# Patient Record
Sex: FEMALE | Race: WHITE | Employment: UNEMPLOYED | URBAN - METROPOLITAN AREA
[De-identification: names, ages, dates, MRNs, and addresses within clinical notes are randomized per-mention and may not be internally consistent; named-entity substitution may affect disease eponyms.]

---

## 2024-04-05 ENCOUNTER — TELEPHONE (OUTPATIENT)
Dept: PLASTIC SURGERY | Facility: CLINIC | Age: 19
End: 2024-04-05
Payer: COMMERCIAL

## 2024-04-05 DIAGNOSIS — F64.0 GENDER DYSPHORIA IN ADULT: Primary | ICD-10-CM

## 2024-04-05 NOTE — TELEPHONE ENCOUNTER
Health Call Center    Phone Message    May a detailed message be left on voicemail: yes     Reason for Call: Appointment Intake    Referring Provider Name: Self Referred  Diagnosis and/or Symptoms: Breast Removal/Mastectomy (Gender Affirmation/Confirmation Surgery)    Pt requesting Dr. Mcdaniel, but is open to other providers if they are sooner appts.    Pt prefers to be called [place]. Preferred pronouns, they/them, non binary.         Action Taken: Message routed to:  [facility] ([facility]): [facility] COMPREHENSIVE GENDER [facility] [[id number]]    Travel Screening: Not Applicable

## 2024-04-08 NOTE — CONFIDENTIAL NOTE
[facility] :  Care Coordination Note     SITUATION   Primo Altman (they/them) is a 18 year old adult who is receiving support for:  Appointment (Breast Removal/Mastectomy (Gender Affirmation/Confirmation Surgery))  .    BACKGROUND     Pt is scheduled for a gender affirming mastectomy consult with Dr. Couch on 11/1/24.     ASSESSMENT     Surgery              [facility] Assessment  Com[facility] ([facility]) Enrollment: (P) Enrolled  Patient has a therapist: (P) Yes  Letter of support #1: (P) Requested  Surgery being considered: (P) Yes  Mastectomy: (P) Yes    Pt reports:   No nicotine or other gender affirming surgeries  PLAN          Nursing Interventions:       Follow-up plan:  1. Obtain LOS. Writer encouraged pt to send this to us 2-3 months in advance.        Cici Carpenter

## 2024-05-20 ENCOUNTER — OFFICE VISIT (OUTPATIENT)
Dept: FAMILY MEDICINE | Facility: CLINIC | Age: 19
End: 2024-05-20
Payer: COMMERCIAL

## 2024-05-20 VITALS
BODY MASS INDEX: 19.52 KG/M2 | HEART RATE: 82 BPM | WEIGHT: 131.8 LBS | RESPIRATION RATE: 17 BRPM | TEMPERATURE: 97.5 F | SYSTOLIC BLOOD PRESSURE: 107 MMHG | OXYGEN SATURATION: 98 % | DIASTOLIC BLOOD PRESSURE: 70 MMHG | HEIGHT: 69 IN

## 2024-05-20 DIAGNOSIS — K59.04 CHRONIC IDIOPATHIC CONSTIPATION: ICD-10-CM

## 2024-05-20 DIAGNOSIS — Z11.59 NEED FOR HEPATITIS C SCREENING TEST: ICD-10-CM

## 2024-05-20 DIAGNOSIS — F33.42 RECURRENT MAJOR DEPRESSIVE DISORDER, IN FULL REMISSION (H): Primary | ICD-10-CM

## 2024-05-20 DIAGNOSIS — Z11.3 ROUTINE SCREENING FOR STI (SEXUALLY TRANSMITTED INFECTION): ICD-10-CM

## 2024-05-20 DIAGNOSIS — Z11.4 SCREENING FOR HIV (HUMAN IMMUNODEFICIENCY VIRUS): ICD-10-CM

## 2024-05-20 DIAGNOSIS — R68.89 SPELLS OF DECREASED ATTENTIVENESS: ICD-10-CM

## 2024-05-20 LAB
HCV AB SERPL QL IA: NONREACTIVE
HIV 1+2 AB+HIV1 P24 AG SERPL QL IA: NONREACTIVE
T PALLIDUM AB SER QL: NONREACTIVE

## 2024-05-20 PROCEDURE — 36415 COLL VENOUS BLD VENIPUNCTURE: CPT | Performed by: STUDENT IN AN ORGANIZED HEALTH CARE EDUCATION/TRAINING PROGRAM

## 2024-05-20 PROCEDURE — 87389 HIV-1 AG W/HIV-1&-2 AB AG IA: CPT | Performed by: STUDENT IN AN ORGANIZED HEALTH CARE EDUCATION/TRAINING PROGRAM

## 2024-05-20 PROCEDURE — 87591 N.GONORRHOEAE DNA AMP PROB: CPT | Performed by: STUDENT IN AN ORGANIZED HEALTH CARE EDUCATION/TRAINING PROGRAM

## 2024-05-20 PROCEDURE — 87491 CHLMYD TRACH DNA AMP PROBE: CPT | Performed by: STUDENT IN AN ORGANIZED HEALTH CARE EDUCATION/TRAINING PROGRAM

## 2024-05-20 PROCEDURE — 86803 HEPATITIS C AB TEST: CPT | Performed by: STUDENT IN AN ORGANIZED HEALTH CARE EDUCATION/TRAINING PROGRAM

## 2024-05-20 PROCEDURE — 86780 TREPONEMA PALLIDUM: CPT | Performed by: STUDENT IN AN ORGANIZED HEALTH CARE EDUCATION/TRAINING PROGRAM

## 2024-05-20 PROCEDURE — 99204 OFFICE O/P NEW MOD 45 MIN: CPT | Mod: GC | Performed by: STUDENT IN AN ORGANIZED HEALTH CARE EDUCATION/TRAINING PROGRAM

## 2024-05-20 RX ORDER — POLYETHYLENE GLYCOL 3350 17 G/17G
1 POWDER, FOR SOLUTION ORAL DAILY
Qty: 510 G | Refills: 4 | Status: SHIPPED | OUTPATIENT
Start: 2024-05-20

## 2024-05-20 RX ORDER — BUPROPION HYDROCHLORIDE 150 MG/1
150 TABLET ORAL EVERY MORNING
Qty: 90 TABLET | Refills: 3 | Status: SHIPPED | OUTPATIENT
Start: 2024-05-20

## 2024-05-20 NOTE — PROGRESS NOTES
Assessment & Plan     Recurrent major depressive disorder, in full remission (H24)  Refills requested today. Condition stable. Provided.     - FLUoxetine (PROZAC) 20 MG capsule  Dispense: 90 capsule; Refill: 3  - buPROPion (WELLBUTRIN XL) 150 MG 24 hr tablet  Dispense: 90 tablet; Refill: 3    Chronic idiopathic constipation  Patient with constipation that resolved with MiraLAX.  Requested refill today.  Pattern of elevation does not seem to fit with constipation though can occasionally.  - polyethylene glycol (MIRALAX) 17 GM/Dose powder  Dispense: 510 g; Refill: 4    Spells of decreased attentiveness  Has concern for ADHD diagnosis, desired testing and possibly medication.   - Adult Mental Health  Referral    Routine screening for STI (sexually transmitted infection)  No symptoms. Requested screening today  - Treponema Abs w Reflex to RPR and Titer  - Chlamydia trachomatis/Neisseria gonorrhoeae by PCR - Clinic Collect    Screening for HIV (human immunodeficiency virus)  - HIV Screening    Need for hepatitis C screening test  - Hepatitis C Screen Reflex to HCV RNA Quant and Genotype              Return if symptoms worsen or fail to improve.    Arianna Tillman is a 18 year old, presenting for the following health issues:  OTHER (Est care and digestive concerns) and Recheck Medication        5/20/2024     8:30 AM   Additional Questions   Roomed by Doua   Accompanied by Self         5/20/2024     8:30 AM   Patient Reported Additional Medications   Patient reports taking the following new medications n/a         5/20/2024    Information    services provided? No     HPI       Wants to get medications from PCP   - Fluoxetine/Buproprion  - Anxiety/Depression   - Working well  - Love their therapist     Digestive issues  - keeping track of how much they are pooping for the last two months  - every 3-5 days  - doesn't feel physically bad  - has been like that for a long time   - Tiny bit of  "straining, but comes out just fine    - has some bloating   - notices bloating more after they eat  - no diarrhea  - constipation sometimes     ADHD assessment  - ok with referral, has noticed intattentiveness       Objective    /70 (BP Location: Left arm, Patient Position: Sitting, Cuff Size: Adult Large)   Pulse 82   Temp 97.5  F (36.4  C) (Oral)   Resp 17   Ht 1.74 m (5' 8.5\")   Wt 59.8 kg (131 lb 12.8 oz)   SpO2 98%   BMI 19.75 kg/m    Body mass index is 19.75 kg/m .  Physical Exam   Constitutional: No acute distress, sitting comfortably  Eyes: EOMI, no eye discharge, no icterus, injection or swelling.  Ears, nose, mouth, throat: Normocephalic, no nasal drainage, speaks clearly, no lip cracking.   Neck: Normal range of motion  CV: Extremeties well perfused  Respiratory: No audible wheezing, strido, cough, or other respiratory distress. Speaking in full sentences.  GI: Nondistended abdomen  MSK: Normal digits, moving extremeties well, symmetric strength visible throughout.  Skin: No visible rashes, bruising or other skin lesions.   Neuro: AOx3  Psych: Cooperative, mood, thought and judgement appropriate to situation.              Signed Electronically by: Sania Brown DO    "

## 2024-05-20 NOTE — PATIENT INSTRUCTIONS
It was great to see you today! Thanks for coming to [facility]!      Here is the plan from today's visit    Come back when you need us!      Thank you for coming to [facility] today.  Lab Testing:  **If you had lab testing today and your results are reassuring or normal they will be mailed to you or sent through [employer] within 7 days.   **If the lab tests need quick action we will call you with the results.  **If you are having labs done on a different day, please call [phone] to schedule at [facility] or [phone] for other [facility] Outpatient Lab locations. Labs do not offer walk-in appointments.  The phone number we will call with results is # [phone] (home) . If this is not the best number please call our clinic and change the number.    Medication Refills:  If you need any refills please call your pharmacy and they will contact us.   If you need to  your refill at a new pharmacy, please contact the new pharmacy directly. The new pharmacy will help you get your medications transferred faster.       Scheduling, Urgent Medical Concerns (24 hours a day!), or ultrasound [phone] (8-5:00 M-F)    Referrals: If a referral was made to an [facility] specialty provider and you do not get a call from central scheduling, please refer to directions on your visit summary or call our office during normal business hours for assistance.     If a Mammogram was ordered for you at the Breast Center call [phone] to schedule or change your appointment.  If you had an XRay/CT/Ultrasound/MRI ordered the number is [phone] to schedule or change your radiology appointment.       Sania Brown, DO  Pronouns: she/her/hers  Resident Physician  [facility] - [facility]/ [facility]    Department of Family Medicine and Community Health     Quality 110: Preventive Care And Screening: Influenza Immunization: Influenza immunization was not ordered or administered, reason not given

## 2024-05-21 LAB
C TRACH DNA SPEC QL PROBE+SIG AMP: NEGATIVE
N GONORRHOEA DNA SPEC QL NAA+PROBE: NEGATIVE

## 2024-06-14 ENCOUNTER — TELEPHONE (OUTPATIENT)
Dept: PLASTIC SURGERY | Facility: CLINIC | Age: 19
End: 2024-06-14
Payer: COMMERCIAL

## 2024-06-14 NOTE — TELEPHONE ENCOUNTER
[facility]    Phone Message    May a detailed message be left on voicemail: yes     Reason for Call: Other: Patient called to schedule another consult for Top Surgery, as the one with Dr. Couch was cancelled.  Female to male and preferred pronouns are they/them/theirs. Please follow up with patient.     Action Taken: Message routed to:  [facility] ([facility]): P Comp Gender Care [facility]    Travel Screening: Not Applicable

## 2024-06-17 NOTE — CONFIDENTIAL NOTE
Writer [facility] re: follow up on request to reschedule cancelled consult with Dr. Couch for top surgery. Writer relayed that we are working on rescheduling solutions and will call back when  we get to the pt on our reschedule list. Writer stated we're moving in order of consult date and it's realistic to expect a significant delay in care. [employer] message sent with referral list.

## 2024-07-07 ENCOUNTER — HEALTH MAINTENANCE LETTER (OUTPATIENT)
Age: 19
End: 2024-07-07

## 2024-10-15 ENCOUNTER — OFFICE VISIT (OUTPATIENT)
Dept: FAMILY MEDICINE | Facility: CLINIC | Age: 19
End: 2024-10-15
Payer: COMMERCIAL

## 2024-10-15 VITALS
WEIGHT: 136 LBS | HEIGHT: 69 IN | TEMPERATURE: 98.7 F | OXYGEN SATURATION: 99 % | HEART RATE: 74 BPM | BODY MASS INDEX: 20.14 KG/M2 | DIASTOLIC BLOOD PRESSURE: 67 MMHG | RESPIRATION RATE: 16 BRPM | SYSTOLIC BLOOD PRESSURE: 101 MMHG

## 2024-10-15 DIAGNOSIS — F33.42 RECURRENT MAJOR DEPRESSIVE DISORDER, IN FULL REMISSION (H): Primary | ICD-10-CM

## 2024-10-15 DIAGNOSIS — K59.04 CHRONIC IDIOPATHIC CONSTIPATION: ICD-10-CM

## 2024-10-15 PROCEDURE — 99214 OFFICE O/P EST MOD 30 MIN: CPT | Mod: GC

## 2024-10-15 PROCEDURE — G2211 COMPLEX E/M VISIT ADD ON: HCPCS

## 2024-10-15 RX ORDER — BUPROPION HYDROCHLORIDE 150 MG/1
150 TABLET ORAL EVERY MORNING
Qty: 90 TABLET | Refills: 3 | Status: SHIPPED | OUTPATIENT
Start: 2024-10-15

## 2024-10-15 ASSESSMENT — ANXIETY QUESTIONNAIRES
3. WORRYING TOO MUCH ABOUT DIFFERENT THINGS: SEVERAL DAYS
GAD7 TOTAL SCORE: 7
4. TROUBLE RELAXING: SEVERAL DAYS
IF YOU CHECKED OFF ANY PROBLEMS ON THIS QUESTIONNAIRE, HOW DIFFICULT HAVE THESE PROBLEMS MADE IT FOR YOU TO DO YOUR WORK, TAKE CARE OF THINGS AT HOME, OR GET ALONG WITH OTHER PEOPLE: SOMEWHAT DIFFICULT
7. FEELING AFRAID AS IF SOMETHING AWFUL MIGHT HAPPEN: NOT AT ALL
5. BEING SO RESTLESS THAT IT IS HARD TO SIT STILL: SEVERAL DAYS
2. NOT BEING ABLE TO STOP OR CONTROL WORRYING: SEVERAL DAYS
7. FEELING AFRAID AS IF SOMETHING AWFUL MIGHT HAPPEN: NOT AT ALL
6. BECOMING EASILY ANNOYED OR IRRITABLE: MORE THAN HALF THE DAYS
GAD7 TOTAL SCORE: 7
8. IF YOU CHECKED OFF ANY PROBLEMS, HOW DIFFICULT HAVE THESE MADE IT FOR YOU TO DO YOUR WORK, TAKE CARE OF THINGS AT HOME, OR GET ALONG WITH OTHER PEOPLE?: SOMEWHAT DIFFICULT
1. FEELING NERVOUS, ANXIOUS, OR ON EDGE: SEVERAL DAYS
GAD7 TOTAL SCORE: 7

## 2024-10-15 ASSESSMENT — PATIENT HEALTH QUESTIONNAIRE - PHQ9
SUM OF ALL RESPONSES TO PHQ QUESTIONS 1-9: 14
SUM OF ALL RESPONSES TO PHQ QUESTIONS 1-9: 14
10. IF YOU CHECKED OFF ANY PROBLEMS, HOW DIFFICULT HAVE THESE PROBLEMS MADE IT FOR YOU TO DO YOUR WORK, TAKE CARE OF THINGS AT HOME, OR GET ALONG WITH OTHER PEOPLE: VERY DIFFICULT

## 2024-10-15 NOTE — PATIENT INSTRUCTIONS
Patient Education   Here is the plan from today's visit    1. Recurrent major depressive disorder, in full remission (H)  -continue wellbutrin and fluoxetine without changes    2. Chronic idiopathic constipation  -increase fiber intake with goal of 5 servings of fruits or vegetables daily  -increase water intake to 64 oz or two of your water bottles daily  -continue with regular activity, goal of 30 minutes 5 times per week          Please call or return to clinic if your symptoms don't go away.    Follow up plan  No follow-ups on file.    Thank you for coming to [facility] today.  Lab Testing:  **If you had lab testing today and your results are reassuring or normal they will be mailed to you or sent through [employer] within 7 days.   **If the lab tests need quick action we will call you with the results.  **If you are having labs done on a different day, please call [phone] to schedule at [facility] or [phone] for other [facility] Outpatient Lab locations. Labs do not offer walk-in appointments.  The phone number we will call with results is # [phone] (home) . If this is not the best number please call our clinic and change the number.  Medication Refills:  If you need any refills please call your pharmacy and they will contact us.   If you need to  your refill at a new pharmacy, please contact the new pharmacy directly. The new pharmacy will help you get your medications transferred faster.   Scheduling:  If you have any concerns about today's visit or wish to schedule another appointment please call our office during normal business hours [phone] (8-5:00 M-F). If you can no longer make a scheduled visit, please cancel via [employer] or call us to cancel.   If a referral was made to an [facility] specialty provider and you do not get a call from central scheduling, please refer to directions on your visit summary or call our office during normal business hours for  assistance.   If a Mammogram was ordered for you at the Breast Center call [phone] to schedule or change your appointment.  If you had an XRay/CT/Ultrasound/MRI ordered the number is [phone] to schedule or change your radiology appointment.   [facility] has limited ultrasound appointments available on Wednesdays, if you would like your ultrasound at [facility], please call [phone] to schedule.   Medical Concerns:  If you have urgent medical concerns please call [phone] at any time of the day.    Mercedes Leo MD

## 2024-10-15 NOTE — PROGRESS NOTES
Assessment & Plan     Recurrent major depressive disorder, in full remission (H)  Overall well-controlled. Some low mood with gap in taking wellbutrin over the past 2 weeks. Able to restart wellbutrin through [facility] clinic yesterday. PHQ-9 suggestive of moderate depression. MAO-7 consistent with mild anxiety-related symptoms. Primo's symptoms related to obsessive thoughts may be related to anxiety, however, differential also includes OCD. Primo states he thinks his recent reduction in overall motivation and mood over the past couple weeks are related to abrupt stopping of wellbutrin, which is a reasonable explanation. Recommend close follow-up given he is restarting wellbutrin to ensure wellbutrin is improving symptoms as well as to assess for OCD symptoms or worsening restlessness with addition of wellbutrin. If symptoms of OCD, would consider increasing fluoxetine and referral to psychiatry. If no symptoms of OCD and does have increased restlessness while on wellbutrin, consider taper of wellbutrin and switch to different selective serotonin reuptake inhibitor given decreased energy on fluoxetine.  -continue fluoxetine 20mg  -continue wellbutrin 150mg  -continue with weekly visits with established mental health therapist    Chronic idiopathic constipation  Primo mentions having chronic constipation for years in which they only have a bowel movement every 3-4 days. Miralax as needed has helped. He has minimal fiber intake and less water intake than I would recommend for someone his size and age. If no improvement, consider TSH check. Can add soluble psyllium fiber vs pill if patient prefers to dietary fiber intake.  -continue miralax as needed  -increase water and other fluid intake to 64 oz daily  -increase fiber intake with goal of 5 vegetable and fruit servings daily  -continue regular activity    Depression Screening Follow Up    Answers submitted by the patient for this visit:  Patient Health  Questionnaire (Submitted on 10/15/2024)  If you checked off any problems, how difficult have these problems made it for you to do your work, take care of things at home, or get along with other people?: Very difficult  PHQ9 TOTAL SCORE: 14  Patient Health Questionnaire (G7) (Submitted on 10/15/2024)  MAO 7 TOTAL SCORE: 7        10/15/2024     3:29 PM   PHQ   PHQ-9 Total Score 14   Q9: Thoughts of better off dead/self-harm past 2 weeks Not at all         10/15/2024     3:29 PM   Last PHQ-9   1.  Little interest or pleasure in doing things 2   2.  Feeling down, depressed, or hopeless 2   3.  Trouble falling or staying asleep, or sleeping too much 3   4.  Feeling tired or having little energy 3   5.  Poor appetite or overeating 0   6.  Feeling bad about yourself 1   7.  Trouble concentrating 3   8.  Moving slowly or restless 0   Q9: Thoughts of better off dead/self-harm past 2 weeks 0   PHQ-9 Total Score 14         Follow Up Actions Taken  Crisis resource information provided in After Visit Summary  Patient counseled, no additional follow up at this time.     Return in about 4 weeks (around 11/12/2024) for Follow up.    The longitudinal plan of care for the diagnosis(es)/condition(s) as documented were addressed during this visit. Due to the added complexity in care, I will continue to support Primo in the subsequent management and with ongoing continuity of care.        Subjective   Primo is a 19 year old, presenting for the following health issues:  Recheck Medication        10/15/2024     3:44 PM   Additional Questions   Roomed by Ohn   Accompanied by Self         10/15/2024    Information    services provided? No        HPI   MDD, medication refill  -studies hydrology through [employer]  -wants to stay in [place] to work after graduation    -fluoxetine 20mg for about one year which has helped  -off wellbutrin for 15 days, able to get refill from school clinic, has been on wellbutrin  "since spring 2024, has helped with energy  -both have helped with anxiety  -obsessive thoughts, circular thinking which has affected some relationships  -more down, depressed with school stress  -no SI or SH  -once per week sees therapist, helpful  -considering ADHD assessment, has information to get earlier evaluation than scheduled visit in March 2025    2. Constipation  -has two BMs per week which is normal for them  -32 oz water per day, coffee in the morning, juice  -vegetable once per week, fruit every 2-3 days due to difficulty preparing them    Review of Systems  Constitutional, HEENT, cardiovascular, pulmonary, gi and gu systems are negative, except as otherwise noted.      Objective    /67   Pulse 74   Temp 98.7  F (37.1  C) (Oral)   Resp 16   Ht 1.74 m (5' 8.5\")   Wt 61.7 kg (136 lb)   SpO2 99%   BMI 20.38 kg/m    Body mass index is 20.38 kg/m .  Physical Exam   GENERAL: alert and no distress  NECK: no adenopathy, no thyromegaly, no asymmetry, masses, or scars  ABDOMEN: soft, nondistended  PSYCH: mentation appears normal, affect normal/bright, normal judgment and insight            Signed Electronically by: Mercedes Leo MD    "

## 2025-02-25 ENCOUNTER — PATIENT OUTREACH (OUTPATIENT)
Dept: FAMILY MEDICINE | Facility: CLINIC | Age: 20
End: 2025-02-25
Payer: COMMERCIAL

## 2025-02-25 NOTE — TELEPHONE ENCOUNTER
Patient Quality Outreach    Patient is due for the following:   Cervical Cancer Screening - PAP Needed    Action(s) Taken:   Schedule a office visit for cervical cancer screening Adult Preventative    Type of outreach:    Phone, left message for patient/parent to call back. and Sent [employer] message.    Questions for provider review:    None           Kasandra Woods RN

## 2025-03-27 ENCOUNTER — VIRTUAL VISIT (OUTPATIENT)
Dept: PSYCHOLOGY | Facility: CLINIC | Age: 20
End: 2025-03-27
Payer: COMMERCIAL

## 2025-03-27 DIAGNOSIS — F41.1 GENERALIZED ANXIETY DISORDER: ICD-10-CM

## 2025-03-27 DIAGNOSIS — F33.0 MAJOR DEPRESSIVE DISORDER, RECURRENT EPISODE, MILD: Primary | ICD-10-CM

## 2025-03-27 ASSESSMENT — COLUMBIA-SUICIDE SEVERITY RATING SCALE - C-SSRS
2. HAVE YOU ACTUALLY HAD ANY THOUGHTS OF KILLING YOURSELF?: NO
ATTEMPT LIFETIME: NO
TOTAL  NUMBER OF ABORTED OR SELF INTERRUPTED ATTEMPTS LIFETIME: NO
6. HAVE YOU EVER DONE ANYTHING, STARTED TO DO ANYTHING, OR PREPARED TO DO ANYTHING TO END YOUR LIFE?: NO
1. HAVE YOU WISHED YOU WERE DEAD OR WISHED YOU COULD GO TO SLEEP AND NOT WAKE UP?: NO
TOTAL  NUMBER OF INTERRUPTED ATTEMPTS LIFETIME: NO

## 2025-03-27 ASSESSMENT — ANXIETY QUESTIONNAIRES
GAD7 TOTAL SCORE: 6
3. WORRYING TOO MUCH ABOUT DIFFERENT THINGS: SEVERAL DAYS
IF YOU CHECKED OFF ANY PROBLEMS ON THIS QUESTIONNAIRE, HOW DIFFICULT HAVE THESE PROBLEMS MADE IT FOR YOU TO DO YOUR WORK, TAKE CARE OF THINGS AT HOME, OR GET ALONG WITH OTHER PEOPLE: SOMEWHAT DIFFICULT
6. BECOMING EASILY ANNOYED OR IRRITABLE: NOT AT ALL
5. BEING SO RESTLESS THAT IT IS HARD TO SIT STILL: SEVERAL DAYS
GAD7 TOTAL SCORE: 6
2. NOT BEING ABLE TO STOP OR CONTROL WORRYING: SEVERAL DAYS
7. FEELING AFRAID AS IF SOMETHING AWFUL MIGHT HAPPEN: NOT AT ALL
1. FEELING NERVOUS, ANXIOUS, OR ON EDGE: SEVERAL DAYS

## 2025-03-27 ASSESSMENT — PATIENT HEALTH QUESTIONNAIRE - PHQ9
5. POOR APPETITE OR OVEREATING: MORE THAN HALF THE DAYS
SUM OF ALL RESPONSES TO PHQ QUESTIONS 1-9: 10

## 2025-03-27 NOTE — PROGRESS NOTES
[facility]         PATIENT'S NAME: Laura Altman  PREFERRED NAME: Primo  PRONOUNS: they/them/theirs  he/him/his     MRN: [id number]  : 2005  ADDRESS: [address] [place] [address]  ACCT. NUMBER:  [id number]  DATE OF SERVICE: 3/27/25  START TIME: 10:00  END TIME: 10:39  PREFERRED PHONE: [phone]  May we leave a program related message: Yes  EMERGENCY CONTACT: was not obtained .  SERVICE MODALITY:  Video Visit:      Provider verified identity through the following two step process.  Patient provided:  Patient     Telemedicine Visit: The patient's condition can be safely assessed and treated via synchronous audio and visual telemedicine encounter.      Reason for Telemedicine Visit: Services only offered telehealth    Originating Site (Patient Location): Patient's other school    Distant Site (Provider Location): [facility]: [facility]    Consent:  The patient/guardian has verbally consented to: the potential risks and benefits of telemedicine (video visit) versus in person care; bill my insurance or make self-payment for services provided; and responsibility for payment of non-covered services.     Patient would like the video invitation sent by:  My Chart    Mode of Communication:  Video Conference via [facility]    Distant Location (Provider):  Off-site    As the provider I attest to compliance with applicable laws and regulations related to telemedicine.    UNIVERSAL ADULT Mental Health DIAGNOSTIC ASSESSMENT    Identifying Information:  Patient is a 19 year old,  (Black and ) partnered individual.  Patient was referred for an assessment by primary care provider .  Patient attended the session alone.    Chief Complaint:   The purpose of this evaluation is to: provide treatment recommendations and clarify diagnosis. Patient reported seeking services at this time for diagnostic assessment and recommendations for treatment.  Patient  reported that they have not    has not completed a previous ADHD diagnostic assessment.  Patient has not received a previous diagnosis of ADHD.  Client spoke to their PCP and teachers may have also been consulted, but they said Client had some symptoms but they were not diagnosed. Patient reported that medication has not been prescribed medication to address these problems. Client has been talking with their therapist throughout the course of their time in college and things have felt very difficult. Client thinks they might be neuro-divergent. Client feels school is very difficult for them. They feel that they can't put all of the energy that they want to into school, because their brain prevents them from doing that. Client can lack motivation to do schoolwork. There are also concerns about perfectionism and attention. They procrastinate and put things off at times. It can be hard to get started. Client will rush to finish things. They have to talk to their professors a lot about getting things done when they can. They get distracted by things going on around them. They will start looking at the internet and lose track of time. They can zone out sometimes and stop doing what they were doing. Client usually tries to do schoolwork alone, but they prefer working in a public place. This makes it easier for them to focus on work in some ways, but it is still a challenge. Client is clean and organized and puts things away. They misplace and lose things. They will walk to the door and realize that they forgot something and have to go back repeatedly. Client is usually punctual and is on time for things. Client finds it easy to listen to lectures. They might zone out at times. It is helpful to have a fidget or knitting project with their hands helps them to listen and tune in. Client can listen in personal conversations. Client does not talk excessively or interrupt people. They feel fidgety and restless. They are not  "impulsive. Client feels their memory is pretty good. They are not too forgetful.        Social/Family History:  Patient was raised in [place], [place].  They were raised by biological parents.  Parents stayed .. They were the second born of two children.  They have an older sister. Patient reported that their childhood was \"pretty chill.\" They spent a lot of time going to soccer games (sister played soccer). They felt loved and supported. The household was safe and stable. Client tried various activities in their younger years (gymnastics, trampoline and rowing). Patient described their current relationships with family of origin as good/close with sister and parents.      The patient describes their cultural background as American.  Cultural influences and impact on patient's life structure, values, norms, and healthcare: Racial or Ethnic Self-Identification identifies as white .  Contextual influences on patient's health include: Individual Factors identifies as Queer .  Cultural, Contextual, and socioeconomic factors do not affect the patient's access to services.  These factors will be addressed in the Preliminary Treatment plan.  Patient identified their preferred language to be English. Patient reported they do not  need the assistance of an  or other support involved in therapy.     Patient reported had no significant delays in developmental tasks.   Patient's highest education level was high school graduate and some college. They are in their senior year at [employer] and will be graduating this spring Patient identified the following learning problems: attention and concentration. Client had difficulties with attention and had a hard time performing. They didn't get good grades throughout most of elementary and middle school. In 10th grade, \"I figured it out and started getting my GPA up.\"  Modifications will not be used to assist communication in therapy.   Patient reports they are  able to " understand written materials.    Patient reported the following relationship history: a few dating relationships. Patient's current relationship status is partnered / significant other for 6-7 months. They get along well.  Patient identified their sexual orientation as  queer .  Patient reported having zero child(nitza). Patient identified partner, parents, siblings, friends, and therapist as part of their support system.  Patient identified the quality of these relationships as stable and meaningful.     Patient's current living/housing situation involves lives apartment with two roommates.  They live with roommates and they report that housing is stable.     Patient is currently a student and reports they are able to function appropriately at school.   Patient reports their finances are obtained through family.  Patient does not identify finances as a current stressor.      Patient reported that they have not been involved with the legal system.   Patient denies being on probation / parole / under the jurisdiction of the court.    Patient's Strengths and Limitations:  Patient identified the following strengths or resources that will help them succeed in treatment: commitment to health and well being, friends / good social support, family support, insight, intelligence, positive school connection, motivation, strong social skills, and work ethic. Things that may interfere with the patient's success in treatment include: none identified.     Assessments:  The following assessments were completed by patient for this visit:  PHQ9:       10/15/2024     3:29 PM 3/27/2025    10:15 AM   PHQ-9 SCORE   PHQ-9 Total Score MyChart 14 (Moderate depression)    PHQ-9 Total Score 14 10     GAD7:       10/15/2024     3:29 PM 3/27/2025    10:15 AM   MAO-7 SCORE   Total Score 7 (mild anxiety)    Total Score 7 6     PROMIS 10-Global Health (all questions and answers displayed):       3/27/2025    10:15 AM   PROMIS 10   In general, would  you say your health is: 4   In general, would you say your quality of life is: 4   In general, how would you rate your physical health? 3   In general, how would you rate your mental health, including your mood and your ability to think? 2   In general, how would you rate your satisfaction with your social activities and relationships? 3   In general, please rate how well you carry out your usual social activities and roles. (This includes activities at home, at work and in your community, and responsibilities as a parent, child, spouse, employee, friend, etc.) 2   To what extent are you able to carry out your everyday physical activities such as walking, climbing stairs, carrying groceries, or moving a chair? 5   In the past 7 days, how often have you been bothered by emotional problems such as feeling anxious, depressed, or irritable? 4   In the past 7 days, how would you rate your fatigue on average? 3   In the past 7 days, how would you rate your pain on average, where 0 means no pain, and 10 means worst imaginable pain? 0   Global Mental Health Score 11   Global Physical Health Score 16   PROMIS TOTAL - SUBSCORES 27     [place] Suicide Severity Rating Scale (Lifetime/Recent)      3/27/2025    10:19 AM   [place] Suicide Severity Rating (Lifetime/Recent)   1. Wish to be Dead (Lifetime) N   2. Non-Specific Active Suicidal Thoughts (Lifetime) N   Actual Attempt (Lifetime) N   Has subject engaged in non-suicidal self-injurious behavior? (Lifetime) Y   Has subject engaged in non-suicidal self-injurious behavior? (Past 3 Months) N   Interrupted Attempts (Lifetime) N   Aborted or Self-Interrupted Attempt (Lifetime) N   Preparatory Acts or Behavior (Lifetime) N   Calculated C-SSRS Risk Score (Lifetime/Recent) No Risk Indicated        Engaged in cutting in early middle school      Personal and Family Medical History:  Patient does not report a family history of mental health concerns.  Patient reports family history is  "not on file..     Patient does report Mental Health Diagnosis and/or Treatment. Patient reported the following previous diagnoses which includes: Depression and Anxiety and Gender Dysphoria.  Patient reported symptoms began in childhood. They think they have always had anxiety and \"obsessive thoughts.\" These were more present when Client was younger (about age 4/5). They started experiencing depression symptoms around age 9. Client explained this was related to academic issues and school work. They weren't \"really getting what I needed out of it\" and that was upsetting. Depression also had to do with \"not fitting in\" at their school and some social issues. Client had some social anxiety. Patient has received mental health services in the past:  medications from PCP and counseling .  When they were in college they had \"severe obsessive thoughts\" around driving. There was a compulsion to \"suddenly convince myself that I hit something while I was driving so I'd go back to see if I actually did see anything.\" This could cause them to drive in circles for 10 minutes straight.\" Psychiatric Hospitalizations: None.  Patient denies a history of civil commitment.  Patient is receiving other mental health services.  These include  medications from PCP .  Client is prescribed Wellbutrin and Prozac by PCP. Client is also taking testosterone 0.2 mL per week. Client has been in therapy with current therapist for 3 years.    Patient has had a physical exam to rule out medical causes for current symptoms.  Date of last physical exam was within the past year. Client was encouraged to follow up with PCP if symptoms were to develop. The patient has a [place] Primary Care Provider, who is named Mercedes Leo..  Patient reports no current medical concerns.  Patient denies any issues with pain. There are significant appetite / nutritional concerns / weight changes.  Patient does not report a history of head injury / trauma / " cognitive impairment.      Patient reports current meds as:   Current Outpatient Medications   Medication Sig Dispense Refill    buPROPion (WELLBUTRIN XL) 150 MG 24 hr tablet Take 1 tablet (150 mg) by mouth every morning. 90 tablet 3    FLUoxetine (PROZAC) 20 MG capsule Take 1 capsule (20 mg) by mouth daily. 90 capsule 3    polyethylene glycol (MIRALAX) 17 GM/Dose powder Take 17 g (1 Capful) by mouth daily (Patient not taking: Reported on 3/27/2025) 510 g 4     No current facility-administered medications for this visit.       Medication Adherence:  Patient reports  .  taking psychiatric medications as prescribed.    Patient Allergies:  No Known Allergies    Medical History:  No past medical history on file.      Current Mental Status Exam:   Appearance:  Appropriate    Eye Contact:  Good   Psychomotor:  Normal       Gait / station:  no problem  Attitude / Demeanor: Cooperative   Speech      Rate / Production: Normal/ Responsive      Volume:  Normal  volume      Language:  no problems and good  Mood:   Normal  Affect:   Appropriate    Thought Content: Clear   Thought Process: Goal Directed  Logical       Associations: No loosening of associations  Insight:   Good   Judgment:  Intact   Orientation:  All  Attention/concentration: Good    Substance Use:   Patient did not report a family history of substance use concerns; see medical history section for details.  Patient has not received chemical dependency treatment in the past.  Patient has not ever been to detox.      Patient is not currently receiving any chemical dependency treatment. Patient reported the following problems as a result of their substance use:   none reported .    Patient reports using alcohol 1 times per month and has 1-2 drinks at a time. Patient first started drinking at age 15.  Patient reported date of last use was a few weeks ago.  Patient reports heaviest use is current use.  Patient denies any symptoms of withdrawal..  Patient has never had  a period of abstinence.., denies using cannabis., and denies using tobacco      Substance Use: No symptoms    Based on the CAGE score of 0 and clinical interview there  are not indications of drug or alcohol abuse.    Significant Losses / Trauma / Abuse / Neglect Issues:   Patient   did not serve in the .  There are not indications or report of significant loss, trauma, abuse or neglect issues related to: are no indications and client denies any losses, trauma, abuse, or neglect concerns.  Patient has not been a victim of exploitation.  Concerns for possible neglect are not present.     Safety Assessment:   Patient denies current or past homicidal ideation and behaviors.  Patient denies current or past suicidal ideation and behaviors.  Patient denies current or past self-injurious behaviors.  Patient denied risk behaviors associated with substance use.  Patient denies any high risk behaviors associated with mental health symptoms.  Patient denied current or past personal safety concerns.    Patient denies past of current/recent assaultive behaviors.    Patient denied a history of sexual assault behaviors.     Patient reports there are not   firearms in the house.    Patient reports the following protective factors: access to and engagement with healthcare, current engagement in treatment and/or motivation to establish therapeutic relationship, strong bond to family unit, community, job, school, etc, supportive social network or family, lives in a responsibly safe environment, and responsibilities to others      Risk Plan:  See Recommendations for Safety and Risk Management Plan    Review of Symptoms per patient report:   Depression: Lack of interest or pleasure in doing things, Feeling sad, down, or depressed, Change in energy level, Change in sleep, Difficulties concentrating, and Psychomotor slowing or agitation  Sanjuana:  No Symptoms  Psychosis: No Symptoms  Anxiety: Excessive worry, Nervousness,  Psychomotor agitation, and Poor concentration  Panic:  No symptoms  Post Traumatic Stress Disorder:  No Symptoms   Eating Disorder: No Symptoms  ADD / ADHD:  Inattentive, Poor task completion, Distractibility, and Restlessness/fidgety  Conduct Disorder: No symptoms  Autism Spectrum Disorder: No symptoms  Obsessive Compulsive Disorder: No Symptoms  Personality Disorders:   No symptoms    Patient reports the following compulsive behaviors and treatment history: None.      Diagnostic Criteria:   Generalized Anxiety Disorder  A. Excessive anxiety and worry about a number of events or activities (such as work or school performance).   B. The person finds it difficult to control the worry.  C. Select 3 or more symptoms (required for diagnosis). Only one item is required in children.   - Restlessness or feeling keyed up or on edge.    - Being easily fatigued.    - Difficulty concentrating or mind going blank.    - Muscle tension.    - Sleep disturbance (difficulty falling or staying asleep, or restless unsatisfying sleep).   D. The focus of the anxiety and worry is not confined to features of an Axis I disorder.  E. The anxiety, worry, or physical symptoms cause clinically significant distress or impairment in social, occupational, or other important areas of functioning.   F. The disturbance is not due to the direct physiological effects of a substance (e.g., a drug of abuse, a medication) or a general medical condition (e.g., hyperthyroidism) and does not occur exclusively during a Mood Disorder, a Psychotic Disorder, or a Pervasive Developmental Disorder. Major Depressive Disorder   - Depressed mood. Note: In children and adolescents, can be irritable mood.     - Diminished interest or pleasure in all, or almost all, activities.    - Decreased sleep.    - Psychomotor activity agitation.    - Diminished ability to think or concentrate, or indecisiveness.     Functional Status:  Patient reports the following functional  impairments:  academic performance and management of the household and or completion of tasks.         Clinical Summary:  1. Psychosocial Factors:  Educational Issues.  Cultural and Contextual Factors: college student in senior year; transgender individual identifies as queer  2. Principal DSM5 Diagnoses  (Sustained by DSM5 Criteria Listed Above):   296.31 (F33.0) Major Depressive Disorder, Recurrent Episode, Mild _.    300.02 (F41.1) Generalized Anxiety Disorder.  RULE OUT: ADHD  5. Prognosis: Maintain Current Status / Prevent Deterioration.  6. Likely consequences of symptoms if not treated: issues at school.  7. Patient strengths include:  caring, educated, empathetic, goal-focused, good listener, has a previous history of therapy, insightful, intelligent, motivated, open to learning, open to suggestions / feedback, support of family, friends and providers, supportive, wants to learn, willing to ask questions, and willing to relate to others .     Recommendations:     1. Plan for Safety and Risk Management:   Safety and Risk: Recommended that patient call 911 or go to the local ED should there be a change in any of these risk factors        Report to child / adult protection services was NA.     4. Resources/Service Plan:    services are not indicated.   Modifications to assist communication are not indicated.   Additional disability accommodations are not indicated.      5. Collaboration:   Collaboration / coordination of treatment will be initiated with the following  support professionals: primary care physician and outpatient therapist.      6.  Referrals:   The following referral(s) will be initiated:  NA .       A Release of Information has been obtained for the following: primary care physician and outpatient therapist.     Clinical Substantiation/medical necessity for the above recommendations:  Medical necessity criteria is warranted in order to: Measure a psychological disorder and its  severity and functional impairment to determine psychiatric diagnosis when a mental illness is suspected, or to achieve a differential diagnosis from a range of medical/psychological disorders that present with similar constellations of symptoms (e.g., determination and measurement of anxiety severity and impact in the presence of ongoing asthma or heart disease), Perform symptom measurement to objectively measure treatment effectiveness and/or determine the need to refer for pharmacological treatment or other medical evaluation (e.g., based on severity and chronicity of symptoms), and Evaluate primary symptoms of impaired attention and concentration that can occur in many neurological and psychiatric conditions..    7. DONYA:    DONYA:  Discussed the general effects of drugs and alcohol on health and well-being. Provider gave patient printed information about the  effects of chemical use on their health and well being. Recommendations:  NA .     8. Records:   These were reviewed at time of assessment.   Information in this assessment was obtained from the medical record and  provided by patient who is a good historian.    Patient will have open access to their mental health medical record.    9.   Interactive Complexity: No    10. Safety Plan: No Safety plan indicated    Provider Name/ Credentials:  Najma Mendez, PhD, LP  March 27, 2025

## 2025-04-01 ENCOUNTER — PATIENT OUTREACH (OUTPATIENT)
Dept: FAMILY MEDICINE | Facility: CLINIC | Age: 20
End: 2025-04-01
Payer: COMMERCIAL

## 2025-04-01 NOTE — TELEPHONE ENCOUNTER
Patient Quality Outreach    Patient is due for the following:   Cervical Cancer Screening - PAP Needed    Action(s) Taken:   Schedule a office visit for cervical cancer screening Adult Preventative    Type of outreach:    Phone, left message for patient/parent to call back. and Sent [employer] message.    Questions for provider review:    None         Kasandra Woods RN

## 2025-04-02 ENCOUNTER — VIRTUAL VISIT (OUTPATIENT)
Dept: PSYCHOLOGY | Facility: CLINIC | Age: 20
End: 2025-04-02
Payer: COMMERCIAL

## 2025-04-02 DIAGNOSIS — F33.0 MAJOR DEPRESSIVE DISORDER, RECURRENT EPISODE, MILD: Primary | ICD-10-CM

## 2025-04-02 DIAGNOSIS — F41.1 GENERALIZED ANXIETY DISORDER: ICD-10-CM

## 2025-04-02 PROCEDURE — 90832 PSYTX W PT 30 MINUTES: CPT | Mod: 95 | Performed by: PSYCHOLOGIST

## 2025-04-02 NOTE — PROGRESS NOTES
"Progress Note     Client Name:  Laura \"Eric" Branch Date: 4/2/2025     Service Type: Individual    Telemedicine Visit: The patient's condition can be safely assessed and treated via synchronous audio and visual telemedicine encounter.      Reason for Telemedicine Visit: Services only offered telehealth    Originating Site (Patient Location): Patient's home        Distant Location (provider location):  Off-site    Consent:  The patient/guardian has verbally consented to: the potential risks and benefits of telemedicine (video visit) versus in person care; bill my insurance or make self-payment for services provided; and responsibility for payment of non-covered services.     Mode of Communication:  Video Conference via [employer]    As the provider I attest to compliance with applicable laws and regulations related to telemedicine.    Session Start Time: 9:00  Session End Time: 9:25     Session Length: 25 minutes    Session #: 2     Attendees: Client attended alone     Intervention: reviewed strategies for managing depression and anxiety; motivational interviewing: explored potential barriers for making healthy changes    Identifying Information:  Client is a 19 year old,  and Black (), partnered / significant other individual. Client was referred for a diagnostic assessment by PCP.  The purpose of this evaluation is to: provide treatment recommendations and clarify diagnosis.  Client is currently a student and reports they are  able to function appropriately at school.. Client attended the session alone.       Client's Statement of Presenting Concern:  Client reported seeking services at this time for diagnostic assessment and recommendations for treatment.  Client's presenting concerns include: Client has been talking with their therapist throughout the course of their time in college and things have felt very difficult. Client thinks they might be neuro-divergent. Client feels school is " very difficult for them. They feel that they can't put all of the energy that they want to into school, because their brain prevents them from doing that. Client can lack motivation to do schoolwork. There are also concerns about perfectionism and attention. They procrastinate and put things off at times. It can be hard to get started. Client will rush to finish things. They have to talk to their professors a lot about getting things done when they can. They get distracted by things going on around them. They will start looking at the internet and lose track of time. They can zone out sometimes and stop doing what they were doing. Client usually tries to do schoolwork alone, but they prefer working in a public place. This makes it easier for them to focus on work in some ways, but it is still a challenge. Client is clean and organized and puts things away. They misplace and lose things. They will walk to the door and realize that they forgot something and have to go back repeatedly. Client is usually punctual and is on time for things. Client finds it easy to listen to lectures. They might zone out at times. It is helpful to have a fidget or knitting project with their hands helps them to listen and tune in. Client can listen in personal conversations. Client does not talk excessively or interrupt people. They feel fidgety and restless. They are not impulsive. Client feels their memory is pretty good. They are not too forgetful. When Client starts a task, they would like to do one thing at a time, but they end up bouncing back and forth between things. They might lose interest in what they were originally doing or they get distracted. They ultimately end up finishing things. Client stated that symptoms have resulted in the following functional impairments:  academic performance and management of the household and or completion of tasks.         History of Presenting Concern:  Patient reported that they have not  "completed a previous ADHD diagnostic assessment. Patient has not received a previous diagnosis of ADHD.  Client spoke to their PCP and teachers may have also been consulted, but they said Client had some symptoms but they were not diagnosed. Patient reported that medication has not been prescribed medication to address these problems. Client reported that these problems began in childhood. Client experienced obsessive thoughts at a young age (4/5) and had depression around age 9. Client thinks ADHD symptoms started in first grade. Client has not attempted to resolve these concerns in the past. Client reported that other professionals are involved in providing support / services. These include  medications from PCP. Client is prescribed Wellbutrin and Prozac by PCP. Client is also taking testosterone 0.2 mL per week. Client has been in therapy with current therapist for 3 years.       Social History:  As a child, client reported that they had problems with organization and keeping track of items and needed frequent reminders by parents to be motivated or to complete work. It was hard to keep their room clean in elementary school and middle school. In high school, they \"delved into cleanness\" and it turned things around. Client reported no difficulty with childhood peer relationships.  They moved around a lot of different friend groups and they were social. As a child, client reported having sleep disturbance, including: insomnia. It was hard for them to sleep until they were 12/13 years old. They would get very anxious at night and they'd go to their parents' room in the middle of the night feeling scared and their parents had to calm them down. Client reported currently experiencing regular and consistent sleep patterns. They feel tired during the day. Client reported sleeping approximately 7-8 hours per night. In the last two years, Client gets very exhausted by 1:00 or 2:00pm and they are taking a lot of naps. " "They \"crash\" at the same time every day and would like to have more energy. Client reported that they have not completed a sleep study.  Client reported having a well balanced diet. There are not significant nutritional concerns. Client reported sporadic exercise patterns.      Client's highest education level was high school graduate and some college. Client graduated high school in 2021 with a 3.4 GPA. They estimated they obtained mostly As and Bs. Client had difficulties with attention and had a hard time performing. Client explained they didn't get good grades throughout most of elementary and middle school. In 10th grade, \"I figured it out and started getting my GPA up.\" During the elementary, middle, and high school years, patient recalls academic strengths in the area of history. Client reported experiencing academic problems in math. Client did identify the following learning problems: attention and concentration. Client did receive tutoring services during the school years. In middle school they had a few programs where some kids could stay after school and do extra work. Client was also pulled out of class in elementary school to go to another room to do more math (extra help). Client did not receive special education services. Client reported failure to finish or complete homework. Client did not have attendance issues. In 9th grade, Client would go to class and then leave for an extended amount of time to just walk around. Client often felt bored in class. They felt sad and down and \"not doing well mentally\" and this made it hard to do well in class. Client thinks that anxiety and depression were affecting them at this time.     Client would not do homework unless parents sat them down to do it. They often did not have something to turn in. They didn't prioritize homework and then wouldn't get it done. Client was hanging out with friends and really liked watching [employer] videos and TV, so they would do " "this instead. Client and their parents got frustrated when they struggled to understand. Sometimes it was hard to grasp the concepts and this made it hard to feel motivated to try. This made Client sad and it was hard to make progress and work through problems. Client felt they didn't know how to study for tests for a long time. They would absorb information in class and then just take the tests. They were not talkative or disruptive in class. In 10th grade, Client's mental health improved. They joined the rowing team and had more social support and felt more motivated to do better in class. Their  structured the class so that there was a clear way to study for tests and this \"clicked\" for Client and was helpful. Client was managing their time better and they would get homework done after rowing practice. There was also a study fuller time during the day that they used to complete homework. Client did attend post-secondary school. They are in their senior year at [employer] and will be graduating this spring of 2025. They are majoring in geology. Client reported they have been obtaining mostly As and Bs. There were a few Cs in science classes. Client has not had attendance issues. Client has struggled with meeting deadlines. They talk to professors and let them know that they are behind. They feel they are working on the assignments. In earlier years of college, there was some perfectionism which caused Client to take longer to finish things and they'd miss deadlines. This has gotten better in the last two years of college. They are procrastinating a bit and waiting to do things until the last minute. Client was prioritizing social life but also felt like they didn't really know what they should be writing. They were afraid their style of writing \"was not as good as all of the other students.\" They felt like they \"missed a lesson that everyone else learned in college on how to participate in discussion " "and write an academic paper.\"      Client reported that they is currently a student.  Client reported that they have not had issues at work . It felt like they never got a grasp of everything that they needed to know at the  job, but they performed fine. 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 The longest period of employment has been for one or two summers. Client has not been terminated from a place of employment.           Risk Taking Behaviors:  Client reported no history of risk taking behaviors      Motor Vehicle Operation:  Client has received a 's license.  Client has not received any moving violations.  Client reported the following driving habits: attentive and cautious.  According to client, other people are comfortable riding as a passenger when they is driving.        Mental Status Assessment:  Appearance:   Appropriate   Eye Contact:   Good   Psychomotor Behavior: Normal   Attitude:   Cooperative   Orientation:   All  Speech   Rate / Production: Normal    Volume:  Normal   Mood:    Normal  Affect:    Appropriate   Thought Content:  Clear   Thought Form:  Coherent  Logical   Insight:    Good       Review of Symptoms:  Depression:     Lack of interest or pleasure in doing things, Feeling sad, down, or depressed, Change in energy level, Change in sleep, Difficulties concentrating, and Psychomotor slowing or agitation  Sanjuana:             No Symptoms  Psychosis:       No Symptoms  Anxiety:           Excessive worry, Nervousness, Psychomotor agitation, and Poor concentration  Panic:              No symptoms  Post Traumatic Stress Disorder:  No Symptoms   Eating Disorder:          No Symptoms  ADD / ADHD:              Inattentive, Poor task completion, Distractibility, and Restlessness/fidgety  Conduct Disorder:       No symptoms  Autism Spectrum Disorder:     No symptoms  Obsessive Compulsive Disorder:       No " Symptoms  Personality Disorders:   No symptoms  Reckless Behavior: No symptoms        Safety Issues and Plan for Safety and Risk Management:  Client has had a history of Engaged in cutting in early middle school     Client denies current fears or concerns for personal safety.  Client denies current or recent suicidal ideation or behaviors.  Client denies current or recent homicidal ideation or behaviors.  Client denies current or recent self injurious behavior or ideation.  Client denies other safety concerns.  Client reports there are no firearms in the house.  Recommended that patient call 911 or go to the local ED should there be a change in any of these risk factors        Diagnostic Criteria:  Attention Deficit Hyperactivity Disorder  A) A persistent pattern of inattention and/or hyperactivity-impulsivity that interferes with functioning or development, as characterized by (1) Inattention and/or (2) Hyperactivity and Impulsivity  - Often fails to give close attention to details or makes careless mistakes in schoolwork, at work, or during other activities  - Often has difficulty sustaining attention in tasks or play activities  - Often avoids, dislikes, or is reluctant to engage in tasks that require sustained mental effort  - Is often easily distractedby extraneous stimuli  - Is often forgetful in daily activities  - Often fidgets with or taps hands or feet or squirms in seat    Generalized Anxiety Disorder  A. Excessive anxiety and worry about a number of events or activities (such as work or school performance).   B. The person finds it difficult to control the worry.  C. Select 3 or more symptoms (required for diagnosis). Only one item is required in children.   - Restlessness or feeling keyed up or on edge.    - Being easily fatigued.    - Difficulty concentrating or mind going blank.    - Muscle tension.    - Sleep disturbance (difficulty falling or staying asleep, or restless unsatisfying sleep).   D. The  focus of the anxiety and worry is not confined to features of an Axis I disorder.  E. The anxiety, worry, or physical symptoms cause clinically significant distress or impairment in social, occupational, or other important areas of functioning.   F. The disturbance is not due to the direct physiological effects of a substance (e.g., a drug of abuse, a medication) or a general medical condition (e.g., hyperthyroidism) and does not occur exclusively during a Mood Disorder, a Psychotic Disorder, or a Pervasive Developmental Disorder.     Major Depressive Disorder   - Depressed mood. Note: In children and adolescents, can be irritable mood.     - Diminished interest or pleasure in all, or almost all, activities.    - Decreased sleep.    - Psychomotor activity agitation.    - Diminished ability to think or concentrate, or indecisiveness.     Functional Status:  Client's symptoms have caused reduced functional status in the following areas:  academic performance and management of the household and or completion of tasks.         DSM-5 Diagnoses: (Sustained by DSM5 Criteria Listed Above)      296.31 (F33.0) Major Depressive Disorder, Recurrent Episode, Mild    300.02 (F41.1) Generalized Anxiety Disorder  RULE OUT: ADHD    Attendance Agreement:  Client has signed Attendance Agreement:No: unable to sign via telehealth      Preliminary Plan:  The client reports no currently identified Denominational, ethnic or cultural issues relevant to therapy.     services are not indicated.    Modifications to assist communication are not indicated.    Collaboration / coordination of treatment will be initiated with the following support professionals: primary care physician and outpatient therapist.    Referral to another professional/service is not indicated at this time..    A Release of Information is not needed at this time.    Client was given self and collaborative rating scales to be completed prior to the next appointment.   Client consented to sending/receiving these measures via email.   Depression and anxiety rating scales were completed.  A third appointment was not scheduled at this time.     Report to child / adult protection services was NA.    Patient will have open access to their mental health medical record.    Najma Mendez, PhD, LP  April 2, 2025

## 2025-04-18 ENCOUNTER — TELEPHONE (OUTPATIENT)
Dept: FAMILY MEDICINE | Facility: CLINIC | Age: 20
End: 2025-04-18
Payer: COMMERCIAL

## 2025-04-18 NOTE — TELEPHONE ENCOUNTER
[facility] phone call message- patient requesting a refill:    Full Medication Name: buPROPion (WELLBUTRIN XL) 150 MG 24 hr tablet     Pharmacy confirmed as   [id number] - [place], [place] - [address]  [address] [place] [address]  Phone: [phone] Fax: [phone]  : Yes    Additional Comments: Pt requesting bridge refill until appointment on 05/13     OK to leave a message on voice mail? Yes    Primary language: English      needed? No    Call taken on April 18, 2025 at 11:05 AM by Papito Mix

## 2025-04-29 NOTE — TELEPHONE ENCOUNTER
Pt calling for an update on this medication and if there will be the possibility for a bridge refill.    Pt requests callback if refill has been filled or denied    Papito Mix

## 2025-04-29 NOTE — TELEPHONE ENCOUNTER
Per dispense report patient has refills available and needs to reach out to their pharmacy. [employer] message sent to patient.       BUPROPION HCL  MG TABLET   Sig: take 1 tablet by mouth every morning   Dispensed: 1/31/2025 12:00 AM   Unit strength: 150 mg   Days supply: 90   Quantity: 90 Units   Refills remaining: 3   Pharmacy: [id number] - [place], [place] - [facility] [place] [address]  Phone: [phone]  Fax: [phone]     Oneyda Bae RN

## 2025-07-01 ENCOUNTER — PATIENT OUTREACH (OUTPATIENT)
Dept: FAMILY MEDICINE | Facility: CLINIC | Age: 20
End: 2025-07-01
Payer: COMMERCIAL

## 2025-07-01 DIAGNOSIS — Z12.4 CERVICAL CANCER SCREENING: ICD-10-CM

## 2025-07-01 NOTE — TELEPHONE ENCOUNTER
Patient Quality Outreach    Patient is due for the following:   Cervical Cancer Screening - PAP Needed    Action(s) Taken:   No follow up needed at this time.  Pt reports completing earlier this year at [facility] and was normal.    Type of outreach:    Phone, spoke to patient/parent. Primo    Questions for provider review:    None         Kasandra SMITH CNM, [facility]

## 2025-07-13 ENCOUNTER — HEALTH MAINTENANCE LETTER (OUTPATIENT)
Age: 20
End: 2025-07-13